# Patient Record
Sex: FEMALE | Race: WHITE | NOT HISPANIC OR LATINO
[De-identification: names, ages, dates, MRNs, and addresses within clinical notes are randomized per-mention and may not be internally consistent; named-entity substitution may affect disease eponyms.]

---

## 2017-06-19 ENCOUNTER — APPOINTMENT (OUTPATIENT)
Dept: DERMATOLOGY | Facility: CLINIC | Age: 72
End: 2017-06-19

## 2018-06-21 ENCOUNTER — RECORD ABSTRACTING (OUTPATIENT)
Age: 73
End: 2018-06-21

## 2018-06-21 DIAGNOSIS — Z95.828 PRESENCE OF OTHER VASCULAR IMPLANTS AND GRAFTS: ICD-10-CM

## 2018-06-21 DIAGNOSIS — Z78.9 OTHER SPECIFIED HEALTH STATUS: ICD-10-CM

## 2018-06-21 DIAGNOSIS — Z98.890 OTHER SPECIFIED POSTPROCEDURAL STATES: ICD-10-CM

## 2018-06-21 RX ORDER — ASPIRIN 81 MG
81 TABLET, DELAYED RELEASE (ENTERIC COATED) ORAL DAILY
Refills: 0 | Status: ACTIVE | COMMUNITY

## 2018-07-02 ENCOUNTER — APPOINTMENT (OUTPATIENT)
Dept: CARDIOLOGY | Facility: CLINIC | Age: 73
End: 2018-07-02
Payer: MEDICARE

## 2018-07-02 VITALS
HEART RATE: 69 BPM | WEIGHT: 158 LBS | HEIGHT: 62 IN | SYSTOLIC BLOOD PRESSURE: 110 MMHG | BODY MASS INDEX: 29.08 KG/M2 | DIASTOLIC BLOOD PRESSURE: 70 MMHG | RESPIRATION RATE: 18 BRPM

## 2018-07-02 DIAGNOSIS — Z82.49 FAMILY HISTORY OF ISCHEMIC HEART DISEASE AND OTHER DISEASES OF THE CIRCULATORY SYSTEM: ICD-10-CM

## 2018-07-02 DIAGNOSIS — Z87.19 PERSONAL HISTORY OF OTHER DISEASES OF THE DIGESTIVE SYSTEM: ICD-10-CM

## 2018-07-02 DIAGNOSIS — K52.9 NONINFECTIVE GASTROENTERITIS AND COLITIS, UNSPECIFIED: ICD-10-CM

## 2018-07-02 PROCEDURE — 93000 ELECTROCARDIOGRAM COMPLETE: CPT

## 2018-07-02 PROCEDURE — 99214 OFFICE O/P EST MOD 30 MIN: CPT

## 2018-08-23 ENCOUNTER — MEDICATION RENEWAL (OUTPATIENT)
Age: 73
End: 2018-08-23

## 2018-08-23 RX ORDER — VALSARTAN 40 MG/1
40 TABLET, COATED ORAL DAILY
Qty: 90 | Refills: 3 | Status: DISCONTINUED | COMMUNITY
End: 2018-08-23

## 2018-09-10 ENCOUNTER — RX RENEWAL (OUTPATIENT)
Age: 73
End: 2018-09-10

## 2018-11-20 ENCOUNTER — APPOINTMENT (OUTPATIENT)
Dept: CARDIOLOGY | Facility: CLINIC | Age: 73
End: 2018-11-20
Payer: MEDICARE

## 2018-11-20 PROCEDURE — 93306 TTE W/DOPPLER COMPLETE: CPT

## 2018-11-26 ENCOUNTER — APPOINTMENT (OUTPATIENT)
Dept: CARDIOLOGY | Facility: CLINIC | Age: 73
End: 2018-11-26
Payer: MEDICARE

## 2018-11-26 VITALS
SYSTOLIC BLOOD PRESSURE: 125 MMHG | BODY MASS INDEX: 28.52 KG/M2 | DIASTOLIC BLOOD PRESSURE: 80 MMHG | RESPIRATION RATE: 16 BRPM | WEIGHT: 155 LBS | HEIGHT: 62 IN | HEART RATE: 68 BPM

## 2018-11-26 PROCEDURE — 93000 ELECTROCARDIOGRAM COMPLETE: CPT

## 2018-11-26 PROCEDURE — 99214 OFFICE O/P EST MOD 30 MIN: CPT

## 2018-11-26 RX ORDER — RANOLAZINE 500 MG/1
500 TABLET, FILM COATED, EXTENDED RELEASE ORAL
Qty: 180 | Refills: 3 | Status: DISCONTINUED | COMMUNITY
End: 2018-11-26

## 2018-11-26 RX ORDER — PANTOPRAZOLE SODIUM 40 MG/1
40 TABLET, DELAYED RELEASE ORAL DAILY
Qty: 90 | Refills: 3 | Status: DISCONTINUED | COMMUNITY
End: 2018-11-26

## 2018-11-26 NOTE — ASSESSMENT
[FreeTextEntry1] : ECG:  Regular sinus rhythm at 69 bpm.  Minor non-specific T-wave flattening.  No other significant ST-T wave changes. \par \par Echo 11/20/18\par Overall normal LV size and function. LVEF 60-65%.\par Mild to moderate mitral regurgitation.\par Trivial pericardial effusion.\par Unchanged from prior study 2014.\par \par Labs 05/14/2018 reveal electrolytes normal.  Cholesterol 151.  HDL 49.  LDL 79.   Triglycerides 124.   Of all which reflect an improvement from the prior set of blood work. \par \par Impression:\par 1.  Blood pressure seems to be quite adequately controlled. The use of Valsartan has helped control the hypertension well.  \par 2.  There is no recurrent angina and she is taking her Ranexa regularly.  \par 3.  Lipids are very well controlled.\par 4.  There is no regular exercise. \par 5.  Echo results reviewed w pt.\par \par The patient seems to be stable and there is no indication of change of current management strategy.  She should have repeat visit with blood work in about four months.  She does not require a stress test but she does need to exercise more regularly.  Weight loss was encouraged.  \par

## 2018-11-26 NOTE — REASON FOR VISIT
[FreeTextEntry1] : Patient presents here for a cardiac evaluation with a history which includes:\par 1.  LAD and RCA stenting 2014. \par 2.  Catheterization 2015 showing stent patency. \par 3.  Hypertension. \par 4.  Hyperlipidemia.  \par 5.  An angina syndrome. \par \par The patient has not been exercising regularly, but, she has not had any significant angina.  She states she is taking her medications regularly. \par \par She believes that her blood pressure has been well controlled.  There have been no new cardiac symptoms.  There are no other new intercurrent medical problems. \par

## 2019-08-02 ENCOUNTER — APPOINTMENT (OUTPATIENT)
Dept: CARDIOLOGY | Facility: CLINIC | Age: 74
End: 2019-08-02
Payer: MEDICARE

## 2019-08-02 ENCOUNTER — RECORD ABSTRACTING (OUTPATIENT)
Age: 74
End: 2019-08-02

## 2019-08-02 ENCOUNTER — NON-APPOINTMENT (OUTPATIENT)
Age: 74
End: 2019-08-02

## 2019-08-02 VITALS
RESPIRATION RATE: 16 BRPM | HEART RATE: 72 BPM | WEIGHT: 159 LBS | DIASTOLIC BLOOD PRESSURE: 80 MMHG | OXYGEN SATURATION: 98 % | HEIGHT: 62 IN | BODY MASS INDEX: 29.26 KG/M2 | SYSTOLIC BLOOD PRESSURE: 120 MMHG

## 2019-08-02 PROCEDURE — 93000 ELECTROCARDIOGRAM COMPLETE: CPT

## 2019-08-02 PROCEDURE — 99214 OFFICE O/P EST MOD 30 MIN: CPT

## 2019-08-02 RX ORDER — PANTOPRAZOLE 40 MG/1
40 TABLET, DELAYED RELEASE ORAL
Qty: 90 | Refills: 3 | Status: DISCONTINUED | COMMUNITY
Start: 2018-09-10 | End: 2019-08-02

## 2019-08-02 NOTE — REASON FOR VISIT
[FreeTextEntry1] : Patient presents here for a cardiac evaluation with a history which includes:\par 1.  LAD and RCA stenting 2014. \par 2.  Catheterization 2015 showing stent patency. \par 3.  Hypertension. \par 4.  Hyperlipidemia.  \par 5.  An angina syndrome. \par \par \par \par She believes that her blood pressure has been well controlled.  There have been no new cardiac symptoms.  There are no other new intercurrent medical problems. \par

## 2019-08-02 NOTE — PHYSICAL EXAM
[FreeTextEntry1] :  Well appearing and nourished with no obvious deformities or distress.\par \par Eyes: \par No conjunctival injection and no xanthelasmas.\par HEENT: \par Normocephalic.Normal oral mucosa. No pallor or cyanosis\par Neck: \par No jugular venous distension. with normal A and V wave forms. No palpable adenopathy.\par Cardiovascular: \par Normal rate and rhythm with normal S1, S2 and a grade 1/6 systolic murmur. Distal arterial pulses are normal. No significant peripheral edema.\par Pulmonary: \par Lungs are clear to auscultation and percussion. Normal respiratory pattern without any accessory muscle use\par Abdomen: \par Soft, non-tender ; no palpable organomegaly or masses.\par Extremities:\par No digital clubbing, cyanosis or ischemic changes.\par Skin: \par No skin lesions, rashes, ulcers or xanthomas.\par Psychiatric: \par Alert and oriented to person, place and time. Appropriate mood and affect.

## 2019-08-02 NOTE — HISTORY OF PRESENT ILLNESS
[FreeTextEntry1] : Patient reportedly developed increasing chest pain while she was in [place]. Was hospitalized and had a cardiac catheterization, that by her report, demonstrated that her stents were patent. As such, a cardiac cause of her chest pain was thought to be unlikely.\par She then underwent a GI evaluation and GERD was considered to be more likely. Her acid reduction medication was modified and symptoms improved.

## 2019-08-02 NOTE — REVIEW OF SYSTEMS
[Recent Weight Gain (___ Lbs)] : recent [unfilled] ~Ulb weight gain [FreeTextEntry1] : Other than as documented here and in the HPI, the thirteen point ROS is negative

## 2019-08-02 NOTE — ASSESSMENT
[FreeTextEntry1] : ECG:Sinus  Rhythm  72 BPM\par -  Nonspecific T-abnormality. \par \par Lab data on 4/3/19:\par Cholesterol 172\par HDL              50\par LDL              94\par Triglycerides 186\par LFTs are normal.\par \par Echo 11/20/18\par Overall normal LV size and function. LVEF 60-65%.\par Mild to moderate mitral regurgitation.\par Trivial pericardial effusion.\par Unchanged from prior study 2014.\par \par Labs 05/14/2018 reveal electrolytes normal.  Cholesterol 151.  HDL 49.  LDL 79.   Triglycerides 124.   Of all which reflect an improvement from the prior set of blood work. \par \par Impression:\par 1.  Blood pressure seems to be quite adequately controlled. The use of Valsartan has helped control the      hypertension well.  \par 2.  There is no recurrent angina and she is requesting to be weaned of Ranexa\par 3.  Lipids are increased\par 4.  There is no regular exercise. \par 5.  Echo results reviewed w pt. shows moderate MR\par 6.  Cardiac cath [facility] [place] reportedly shows that stents are patent\par \par The patient seems to be stable.\par We can reduce her Ranexa to 500 mg AM only and see how she does clinically\par She should have repeat visit with blood work in about four months. \par May need to augment the statin Rx\par  She does not require a stress test but she does need to exercise more regularly.  Weight loss was encouraged.  \par

## 2019-08-09 ENCOUNTER — RX RENEWAL (OUTPATIENT)
Age: 74
End: 2019-08-09

## 2019-08-15 ENCOUNTER — RX RENEWAL (OUTPATIENT)
Age: 74
End: 2019-08-15

## 2019-08-19 ENCOUNTER — RX RENEWAL (OUTPATIENT)
Age: 74
End: 2019-08-19

## 2019-08-22 ENCOUNTER — MEDICATION RENEWAL (OUTPATIENT)
Age: 74
End: 2019-08-22

## 2019-11-22 ENCOUNTER — RECORD ABSTRACTING (OUTPATIENT)
Age: 74
End: 2019-11-22

## 2019-12-03 ENCOUNTER — APPOINTMENT (OUTPATIENT)
Dept: CARDIOLOGY | Facility: CLINIC | Age: 74
End: 2019-12-03
Payer: MEDICARE

## 2019-12-03 ENCOUNTER — NON-APPOINTMENT (OUTPATIENT)
Age: 74
End: 2019-12-03

## 2019-12-03 ENCOUNTER — RECORD ABSTRACTING (OUTPATIENT)
Age: 74
End: 2019-12-03

## 2019-12-03 VITALS
OXYGEN SATURATION: 98 % | BODY MASS INDEX: 28.71 KG/M2 | WEIGHT: 156 LBS | HEIGHT: 62 IN | RESPIRATION RATE: 16 BRPM | HEART RATE: 80 BPM | SYSTOLIC BLOOD PRESSURE: 110 MMHG | DIASTOLIC BLOOD PRESSURE: 65 MMHG

## 2019-12-03 DIAGNOSIS — K21.9 GASTRO-ESOPHAGEAL REFLUX DISEASE W/OUT ESOPHAGITIS: ICD-10-CM

## 2019-12-03 DIAGNOSIS — Z00.00 ENCOUNTER FOR GENERAL ADULT MEDICAL EXAMINATION W/OUT ABNORMAL FINDINGS: ICD-10-CM

## 2019-12-03 PROCEDURE — 93000 ELECTROCARDIOGRAM COMPLETE: CPT

## 2019-12-03 PROCEDURE — 99214 OFFICE O/P EST MOD 30 MIN: CPT

## 2019-12-03 NOTE — REASON FOR VISIT
[FreeTextEntry1] : Patient presents here for a cardiac evaluation with a history which includes:\par 1.  LAD and RCA stenting 2014. \par 2.  Catheterization 2019 reportedly showing stent patency. \par 3.  Hypertension. \par 4.  Hyperlipidemia.  \par 5.  An angina syndrome. \par \par \par

## 2019-12-03 NOTE — HISTORY OF PRESENT ILLNESS
[FreeTextEntry1] : Voices concerns of midsternal chest discomfort after eating only, which she attributes to her known history of GERD. Also mentions some minor SOB with exertion. 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 \par \par In January '19   chest pain while down in [place] prompted cardiac catheterization, that by her report, demonstrated that her stents were patent. As such, a cardiac cause of her chest pain was thought to be unlikely.\par \par She then underwent a GI evaluation and GERD was considered to be more likely. \par \par Denies any  palpitations, edema, orthopnea or chest discomfort with exertion.\par \par \par

## 2019-12-03 NOTE — ASSESSMENT
[FreeTextEntry1] : ECG:Sinus  Rhythm  at 60 bpm, diffuse non specific ST-T wave abnormalities. \par \par Lab data on 8/9/19\par Cholesterol 165\par HDL              50\par LDL              86\par Triglycerides 191\par Creat. 1.01\par LFTs are normal.\par \par Echo 11/20/18\par Overall normal LV size and function. LVEF 60-65%.\par Mild to moderate mitral regurgitation.\par Trivial pericardial effusion.\par Unchanged from prior study 2014.\par \par Labs 05/14/2018 reveal electrolytes normal.  Cholesterol 151.  HDL 49.  LDL 79.   Triglycerides 124.   Of all which reflect an improvement from the prior set of blood work. \par \par Impression:\par 1.  Blood pressures adequately controlled, today 110/65.\par 2.  Mid sternal chest discomfort which is likely related to HX of GERD and is only present when she eats, not      suggestive of any cardiac process. ECG unchanged form prior. \par 3.  June lipids elevated, currently not on statin therapy.\par 4.  Increased fatigue and SOB due to deconditioning. She is limited by back discomfort  with nor formal exercise regimen.\par 5.  2018 Echo results shows moderate MR\par 6.  Cardiac cath Jan of 2019 completed in  [place] reportedly shows that stents are patent\par \par \par Plan\par 1. Schedule Carotid doppler.\par 2. Repeat EST and echo by the Spring. \par 3. Will call [facility] for blood work .  \par 4. Plavix and ASA can be held 5 days prior to her Cataract surgery. This will be performed in [place] she is unsure     of the exact details of the performing surgeon.\par \par \par Clinical follow up in 6 months \par

## 2020-05-26 ENCOUNTER — APPOINTMENT (OUTPATIENT)
Dept: CARDIOLOGY | Facility: CLINIC | Age: 75
End: 2020-05-26

## 2020-06-02 ENCOUNTER — APPOINTMENT (OUTPATIENT)
Dept: CARDIOLOGY | Facility: CLINIC | Age: 75
End: 2020-06-02

## 2020-06-08 ENCOUNTER — RX RENEWAL (OUTPATIENT)
Age: 75
End: 2020-06-08

## 2020-07-15 RX ORDER — RANITIDINE 150 MG/1
150 TABLET ORAL TWICE DAILY
Qty: 180 | Refills: 3 | Status: DISCONTINUED | COMMUNITY
Start: 1900-01-01 | End: 2020-07-15

## 2020-07-23 ENCOUNTER — APPOINTMENT (OUTPATIENT)
Dept: CARDIOLOGY | Facility: CLINIC | Age: 75
End: 2020-07-23
Payer: MEDICARE

## 2020-07-23 PROCEDURE — 93015 CV STRESS TEST SUPVJ I&R: CPT

## 2020-08-08 ENCOUNTER — APPOINTMENT (OUTPATIENT)
Dept: CARDIOLOGY | Facility: CLINIC | Age: 75
End: 2020-08-08
Payer: MEDICARE

## 2020-08-08 PROCEDURE — 93015 CV STRESS TEST SUPVJ I&R: CPT

## 2020-08-08 PROCEDURE — A9500: CPT

## 2020-08-08 PROCEDURE — 78452 HT MUSCLE IMAGE SPECT MULT: CPT

## 2020-08-08 RX ADMIN — AMINOPHYLLINE 3 MG/ML: 25 INJECTION, SOLUTION INTRAVENOUS at 00:00

## 2020-08-08 RX ADMIN — REGADENOSON 5 MG/5ML: 0.08 INJECTION, SOLUTION INTRAVENOUS at 00:00

## 2020-08-10 RX ORDER — REGADENOSON 0.08 MG/ML
0.4 INJECTION, SOLUTION INTRAVENOUS
Qty: 4 | Refills: 0 | Status: COMPLETED | OUTPATIENT
Start: 2020-08-08

## 2020-08-10 RX ORDER — AMINOPHYLLINE 25 MG/ML
25 INJECTION, SOLUTION INTRAVENOUS
Qty: 0 | Refills: 0 | Status: COMPLETED | OUTPATIENT
Start: 2020-08-08

## 2020-08-27 ENCOUNTER — RX RENEWAL (OUTPATIENT)
Age: 75
End: 2020-08-27

## 2020-09-10 RX ORDER — KIT FOR THE PREPARATION OF TECHNETIUM TC99M SESTAMIBI 1 MG/5ML
INJECTION, POWDER, LYOPHILIZED, FOR SOLUTION PARENTERAL
Refills: 0 | Status: COMPLETED | OUTPATIENT
Start: 2020-09-10

## 2020-09-10 RX ADMIN — KIT FOR THE PREPARATION OF TECHNETIUM TC99M SESTAMIBI 0: 1 INJECTION, POWDER, LYOPHILIZED, FOR SOLUTION PARENTERAL at 00:00

## 2020-10-09 ENCOUNTER — APPOINTMENT (OUTPATIENT)
Dept: CARDIOLOGY | Facility: CLINIC | Age: 75
End: 2020-10-09
Payer: MEDICARE

## 2020-10-09 PROCEDURE — 93880 EXTRACRANIAL BILAT STUDY: CPT

## 2020-10-09 PROCEDURE — 93306 TTE W/DOPPLER COMPLETE: CPT

## 2020-10-12 ENCOUNTER — NON-APPOINTMENT (OUTPATIENT)
Age: 75
End: 2020-10-12

## 2020-10-12 ENCOUNTER — APPOINTMENT (OUTPATIENT)
Dept: CARDIOLOGY | Facility: CLINIC | Age: 75
End: 2020-10-12
Payer: MEDICARE

## 2020-10-12 VITALS
HEIGHT: 62 IN | WEIGHT: 155 LBS | DIASTOLIC BLOOD PRESSURE: 80 MMHG | OXYGEN SATURATION: 97 % | TEMPERATURE: 97.6 F | RESPIRATION RATE: 16 BRPM | BODY MASS INDEX: 28.52 KG/M2 | SYSTOLIC BLOOD PRESSURE: 128 MMHG | HEART RATE: 74 BPM

## 2020-10-12 PROCEDURE — 99214 OFFICE O/P EST MOD 30 MIN: CPT

## 2020-10-12 PROCEDURE — 93000 ELECTROCARDIOGRAM COMPLETE: CPT

## 2020-10-12 NOTE — HISTORY OF PRESENT ILLNESS
[FreeTextEntry1] : Seems to be feeling much better.  No recurrence of her chest pain.  Not experiencing any shortness of breath.\par Plans on leaving for [place] in a week.  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 \par \par In January '19   chest pain while down in [place] prompted cardiac catheterization, that by her report, demonstrated that her stents were patent. As such, a cardiac cause of her chest pain was thought to be unlikely.\par \par She then underwent a GI evaluation and GERD was considered to be more likely. \par \par Denies any  palpitations, edema, orthopnea or chest discomfort with exertion.\par \par \par

## 2020-10-12 NOTE — REASON FOR VISIT
[FreeTextEntry1] : Patient presents here for a cardiac evaluation with a history which includes:\par \par 1.  LAD and RCA stenting 2014. \par 2.  Catheterization 2019 reportedly showing stent patency. \par 3.  Hypertension. \par 4.  Hyperlipidemia.  \par 5.  An anginal syndrome. \par \par \par

## 2020-10-12 NOTE — ASSESSMENT
[FreeTextEntry1] : ECG:Sinus  Rhythm  at 74 bpm, diffuse non specific ST-T wave abnormalities. \par \par Lab Data 2020\par Chol: 160\par HDL: 49\par LDL: 83\par Tr\par Creat: 0.97\par \par \par \par Lab data on 19\par Cholesterol 165\par HDL              50\par LDL              86\par Triglycerides 191\par Creat. 1.01\par LFTs are normal.\par \par Echocardiogram 10/9/2020:\par Left ventricular ejection fraction 55 to 60%\par 1+ mitral regurgitation.\par \par Carotid Doppler 10/9/2020:\par Mild left internal carotid artery plaquing.\par Right internal carotid artery appears to be pretty clean.\par \par Pharmacologic nuclear stress test 2020:\par No significant ischemia is demonstrated.\par \par Echo 18\par Overall normal LV size and function. LVEF 60-65%.\par Mild to moderate mitral regurgitation.\par Trivial pericardial effusion.\par Unchanged from prior study .\par \par Labs 2018 reveal electrolytes normal.  Cholesterol 151.  HDL 49.  LDL 79.   Triglycerides 124.   Of all which reflect an improvement from the prior set of blood work. \par \par Impression:\par 1.  Blood pressures adequately controlled\par 2.  Mid sternal chest discomfort which is likely related to HX of GERD and is only present when she eats, not      suggestive of any cardiac process. ECG unchanged form prior.  Nuclear stress test shows no evidence of ischemia.\par 3.  Lipids are reasonable though still above target on 20 mg of simvastatin.\par 4.  Increased fatigue and SOB due to deconditioning. She is limited by back discomfort  with nor formal exercise regimen.\par 5.  2018 Echo results shows mild MR\par 6.  Cardiac cath  completed in  [place] reportedly shows that stents are patent\par 7.  No significant carotid disease\par \par Plan\par 1.  Reassured the patient that in view of resolution of chest pain in the absence of ischemia on nuclear stress testing and a cardiac catheterization which did not show any significant in-stent restenosis, that is ischemic chest pain is very unlikely at this point in time.\par \par 2.  Improvement in the mitral regurgitation from moderate to mild\par We will consider rechecking this in 1 to 2 years\par \par 3.  Reasonable though not perfect control of her lipids on 20 of simvastatin\par We will consider a change to rosuvastatin 20 mg on follow-up\par \par 4.  No reason the patient cannot travel to [place].\par \par \par Clinical follow up in 6 months \par

## 2020-10-22 ENCOUNTER — APPOINTMENT (OUTPATIENT)
Dept: CARDIOLOGY | Facility: CLINIC | Age: 75
End: 2020-10-22

## 2020-10-30 ENCOUNTER — APPOINTMENT (OUTPATIENT)
Dept: CARDIOLOGY | Facility: CLINIC | Age: 75
End: 2020-10-30

## 2021-06-02 ENCOUNTER — RX RENEWAL (OUTPATIENT)
Age: 76
End: 2021-06-02

## 2021-06-15 ENCOUNTER — APPOINTMENT (OUTPATIENT)
Dept: CARDIOLOGY | Facility: CLINIC | Age: 76
End: 2021-06-15
Payer: MEDICARE

## 2021-06-15 VITALS
BODY MASS INDEX: 29.44 KG/M2 | RESPIRATION RATE: 16 BRPM | HEART RATE: 72 BPM | SYSTOLIC BLOOD PRESSURE: 122 MMHG | OXYGEN SATURATION: 97 % | DIASTOLIC BLOOD PRESSURE: 70 MMHG | WEIGHT: 160 LBS | TEMPERATURE: 97.1 F | HEIGHT: 62 IN

## 2021-06-15 PROCEDURE — 99214 OFFICE O/P EST MOD 30 MIN: CPT

## 2021-06-15 PROCEDURE — 93000 ELECTROCARDIOGRAM COMPLETE: CPT

## 2021-06-15 NOTE — ASSESSMENT
[FreeTextEntry1] : ECG:Sinus  Rhythm  at 72  bpm, diffuse non specific ST-T wave abnormalities. \par \par Lab Data\par        7/7/20  3/29/21\par Chol: 160      163\par HDL: 49         44\par LDL: 83         82\par Tr       185\par Creat: 0.97\par \par \par Lab data on 19\par Cholesterol 165\par HDL              50\par LDL              86\par Triglycerides 191\par Creat. 1.01\par LFTs are normal.\par \par Labs 2018 \par  electrolytes normal.  \par Cholesterol 151.  \par HDL 49.  \par LDL 79.   \par Triglycerides 124.  \par \par Echocardiogram 10/9/2020:\par Left ventricular ejection fraction 55 to 60%\par 1+ mitral regurgitation.\par \par \par Echo 18\par Overall normal LV size and function. LVEF 60-65%.\par Mild to moderate mitral regurgitation.\par Trivial pericardial effusion.\par Unchanged from prior study .\par \par \par Carotid Doppler 10/9/2020:\par Mild left internal carotid artery plaquing.\par Right internal carotid artery appears to be pretty clean.\par \par Pharmacologic nuclear stress test 2020:\par No significant ischemia is demonstrated.\par \par Impression:\par 1.  Blood pressures adequately controlled\par 2.  Mid sternal chest discomfort which is likely related to HX of GERD and is only present when she eats, not              suggestive of any cardiac process. ECG unchanged form prior.  Nuclear stress test shows no evidence of           ischemia.\par 3.  Lipids are reasonable though still above target on 20 mg of simvastatin.\par 4.  Increased fatigue and SOB due to deconditioning. She is limited by back discomfort  with nor formal exercise regimen.\par 5.   Echo results shows mild MR\par 6.  Cardiac cath  completed in  [place] reportedly shows that stents are patent\par 7.  No significant carotid disease\par 8.  Activity level becoming increasingly limited by back pain with resultant weight gain\par \par Plan\par 1.  Reassured the patient that in view of resolution of chest pain in the absence of ischemia on nuclear stress testing and a cardiac catheterization which did not show any significant in-stent restenosis, that is ischemic chest pain is very unlikely at this point in time.\par \par 2.  Improvement in the mitral regurgitation from moderate to mild\par We will consider rechecking this in 1 to 2 years\par \par 3.  Reasonable though not perfect control of her lipids on 20 of simvastatin\par We will consider a change to rosuvastatin 20 mg on follow-up\par \par 4.  No reason the patient cannot travel to [place].\par \par \par Clinical follow up in 6 months \par

## 2021-06-15 NOTE — HISTORY OF PRESENT ILLNESS
[FreeTextEntry1] : Seems to be feeling much better.  No recurrence of her chest pain.  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  There is also a symptom complex is suggestive of neuropathy.\par Has noticed that through out her day she needs to take periodic breaks due to fatigue. \par \par In January '19   chest pain while down in [place] prompted cardiac catheterization, that by her report, demonstrated that her stents were patent. As such, a cardiac cause of her chest pain was thought to be unlikely.\par \par She then underwent a GI evaluation and GERD was considered to be more likely. \par \par Denies any  palpitations, edema, orthopnea or chest discomfort with exertion.\par \par \par

## 2021-08-29 ENCOUNTER — RX RENEWAL (OUTPATIENT)
Age: 76
End: 2021-08-29

## 2021-09-17 RX ORDER — NITROGLYCERIN 0.4 MG/1
0.4 TABLET SUBLINGUAL
Qty: 1 | Refills: 2 | Status: ACTIVE | COMMUNITY
Start: 2019-12-03 | End: 1900-01-01

## 2021-11-18 ENCOUNTER — RX RENEWAL (OUTPATIENT)
Age: 76
End: 2021-11-18

## 2021-12-07 ENCOUNTER — APPOINTMENT (OUTPATIENT)
Dept: CARDIOLOGY | Facility: CLINIC | Age: 76
End: 2021-12-07
Payer: MEDICARE

## 2021-12-07 VITALS
RESPIRATION RATE: 16 BRPM | SYSTOLIC BLOOD PRESSURE: 125 MMHG | BODY MASS INDEX: 29.44 KG/M2 | OXYGEN SATURATION: 98 % | DIASTOLIC BLOOD PRESSURE: 80 MMHG | HEIGHT: 62 IN | WEIGHT: 160 LBS | HEART RATE: 73 BPM

## 2021-12-07 PROCEDURE — 99214 OFFICE O/P EST MOD 30 MIN: CPT

## 2021-12-07 PROCEDURE — 93000 ELECTROCARDIOGRAM COMPLETE: CPT

## 2021-12-07 NOTE — ASSESSMENT
[FreeTextEntry1] : ECG:Sinus  Rhythm  at 73  bpm, with LBBB\par \par Lab Data\par        7/7/20  3/29/21  ------------------- 21\par \par Chol: 160      163      ------------------- 160\par HDL: 49         44       ------------------- 48\par LDL: 83         82       ------------------- 87\par Tr       185     ------------------- 146\par Creat: 0.97                                        1.04\par A1C                                                    5.3\par \par \par \par Echocardiogram 10/9/2020:\par Left ventricular ejection fraction 55 to 60%\par 1+ mitral regurgitation.\par \par \par Echo 18\par Overall normal LV size and function. LVEF 60-65%.\par Mild to moderate mitral regurgitation.\par Trivial pericardial effusion.\par Unchanged from prior study .\par \par \par Carotid Doppler 10/9/2020:\par Mild left internal carotid artery plaquing.\par Right internal carotid artery appears to be pretty clean.\par \par Pharmacologic nuclear stress test 2020:\par No significant ischemia is demonstrated.\par \par Impression:\par 1.  Blood pressures adequately controlled.  Transient hypotension noted which has not reoccurred\par \par 2.  Prior Mid sternal chest discomfort which is likely related to HX of GERD and is only present when she eats, not       suggestive of any cardiac process Nuclear stress test shows no evidence of ischemia.\par \par 3. ECG today with new LBBB but still w/ any coronary symptoms. Likely that change is is just progressive      conduction disease but we can not exclude  cardiomyopathic changes.  Given absence of any significant findings on cardiac catheterization , ischemia is far less likely\par \par 3.  LDL still not at goal.\par \par 4.  Baseline fatigue.She is limited by back discomfort and now heel spur with no formal exercise regimen.\par \par 2020 Echo results shows improvement in the mitral regurgitation from moderate to mild\par \par 6.  Cardiac cath  completed in  [place] reportedly shows that stents are patent\par \par 7.  No significant carotid disease\par \par \par Plan\par 1.  Reassured the patient that in view of resolution of chest pain in the absence of ischemia on nuclear stress testing and a cardiac catheterization which did not show any significant in-stent restenosis, that is ischemic chest pain is very unlikely at this point in time.\par \par 2.  Repeat echo with regard to new LBBB.\par \par 3.  For optimal control of LDL with a goal of <70 and her hx o CAD we will change Simvastatin to Rosuvastatin             20 mg QD. \par      -Repeat BW due in 3 months to reassess lipids and LFTs. \par \par Clinical follow up in 6 months \par

## 2021-12-07 NOTE — HISTORY OF PRESENT ILLNESS
[FreeTextEntry1] : Generally speaking feels well. Did have an URI in Aug.  No recurrence of her chest pain.  Not experiencing any shortness of breath.\par \par In Sept-Oct. she was having some issues with Hypotension/ lightheadedness. He PCP suggested she stop her HCTZ and Irbesartan which she did for a short period but restarted both meds b/c blood pressure started to trend upward.  No recurrence of hypotension noted.\par \par Plans on returning to [place] for one month. 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  There is also a symptom complex is suggestive of neuropathy.\par \par In January '19   chest pain while down in [place] prompted cardiac catheterization, that by her report, demonstrated that her stents were patent. As such, a cardiac cause of her chest pain was thought to be unlikely.\par \par She then underwent a GI evaluation and GERD was considered to be more likely. \par \par \par

## 2021-12-14 ENCOUNTER — APPOINTMENT (OUTPATIENT)
Dept: CARDIOLOGY | Facility: CLINIC | Age: 76
End: 2021-12-14
Payer: MEDICARE

## 2021-12-14 PROCEDURE — 93306 TTE W/DOPPLER COMPLETE: CPT

## 2022-03-22 ENCOUNTER — NON-APPOINTMENT (OUTPATIENT)
Age: 77
End: 2022-03-22

## 2022-06-28 ENCOUNTER — APPOINTMENT (OUTPATIENT)
Dept: CARDIOLOGY | Facility: CLINIC | Age: 77
End: 2022-06-28

## 2022-06-28 VITALS
BODY MASS INDEX: 29.44 KG/M2 | OXYGEN SATURATION: 97 % | DIASTOLIC BLOOD PRESSURE: 70 MMHG | HEART RATE: 73 BPM | HEIGHT: 62 IN | WEIGHT: 160 LBS | SYSTOLIC BLOOD PRESSURE: 114 MMHG | RESPIRATION RATE: 16 BRPM

## 2022-06-28 PROCEDURE — 99214 OFFICE O/P EST MOD 30 MIN: CPT

## 2022-06-28 PROCEDURE — 93000 ELECTROCARDIOGRAM COMPLETE: CPT

## 2022-06-28 RX ORDER — NYSTATIN 100000 U/G
100000 OINTMENT TOPICAL
Qty: 30 | Refills: 0 | Status: DISCONTINUED | COMMUNITY
Start: 2022-03-24

## 2022-06-28 RX ORDER — NITROFURANTOIN (MONOHYDRATE/MACROCRYSTALS) 25; 75 MG/1; MG/1
100 CAPSULE ORAL
Qty: 10 | Refills: 0 | Status: DISCONTINUED | COMMUNITY
Start: 2022-06-20

## 2022-06-28 NOTE — REASON FOR VISIT
[FreeTextEntry1] : Patient presents here for a cardiac evaluation with a history which includes:\par \par 1.  LAD and RCA stenting 2014. \par 2.  Catheterization 2019 in [place] reportedly showing stent patency. \par 3.  Hypertension. \par 4.  Hyperlipidemia.  \par 5.  An anginal syndrome. \par 6. Carotid artery atherosclerosis\par \par \par \par

## 2022-06-28 NOTE — ASSESSMENT
[FreeTextEntry1] : ECG:Sinus  Rhythm  at 73  bpm, LBBB\par \par Lab Data\par        20-----3/29/21---- 21---2/3/22\par Chol: 160--------163--------- 160-------135\par HDL:  49----------44---------- 48--------48\par LDL:  83----------82---------- 87---------66\par Tr---------185-------- 146-------128\par Cr     0.97---------------------1.04\par A1C ---------------------------5.3\par \par \par Nuclear Stress Test 22 (Done in [place] for clearance for Mohs procedure)\par Negative for ischemia, rate related BBB\par \par Echocardiogram 21:\par Normal LV internal cavity size\par Abnormal septal motion\par LVEF 65%\par Normal biatrial size and structure\par Mild mitral regurgitation\par \par Echocardiogram 10/9/2020:\par Left ventricular ejection fraction 55 to 60%\par 1+ mitral regurgitation.\par \par \par Echo 18\par Overall normal LV size and function. LVEF 60-65%.\par Mild to moderate mitral regurgitation.\par Trivial pericardial effusion.\par Unchanged from prior study .\par \par \par Carotid Doppler 10/9/2020:\par Mild left internal carotid artery plaquing.\par Right internal carotid artery appears to be pretty clean.\par \par Pharmacologic nuclear stress test 2020:\par No significant ischemia is demonstrated.\par \par Impression:\par 1.  Blood pressures adequately controlled. Past reports of hypotension have not recurred. \par \par 2.  Prior Mid sternal chest discomfort which is likely related to HX of GERD and is only present when she eats, not       suggestive of any cardiac process. Nuclear stress test shows no evidence of ischemia. Currently being      followed by GI, takes Protonix daily. \par \par 3. ECG with persistent LBBB, unchanged from prior. Pt without any coronary symptoms. Likely this change is     progressive conduction disease but we can not exclude  cardiomyopathic changes.  Given absence of any     significant findings on cardiac catheterization in  and more recently nuclear stress test in 2022      ischemia is far less like the echocardiogram 2021 did not show any new wall motion abnormalities other than asynchrony of the septum consistent with conduction abnormality\par \par 3.  LDL now well controlled and at goal of less than 70 since switching to Rosuvastatin. \par \par 4.  Baseline fatigue.She is limited by back discomfort and now heel spur with no formal exercise regimen.\par \par 5.   Echo results shows improvement in the mitral regurgitation from moderate to mild\par \par 6.  Cardiac cath  completed in  [place] reportedly shows that stents are patent\par       Pharmacologic nuclear stress 2022 without ischemia.\par \par 7.  No significant carotid disease. No bruit on exam. \par \par \par Plan\par 1.  Reassured the patient that in view of resolution of chest pain and in the absence of ischemia on nuclear stress testing and  cardiac catheterization which did not show any significant stent restenosis, her past chest pain syndrome is not likely cardiac in nature. \par \par 2.  No additional cardiac testing at this time. \par \par 3. Advised to exercise to the extent that she can. Advised to do light to moderate exercises such as biking or walking at least 30 minutes a day most days of the week. \par \par 4. Continue to follow a low-fat, low-carbohydrate diet. Continue to avoid any processed foods and those high in sodium. \par \par 5. Follow-up blood work in 2 months. \par \par \par Pt knows to call if any new or worsening symptoms. In the absence of any cardiac associated symptoms clinical follow up can be made in December. \par \par

## 2022-06-28 NOTE — HISTORY OF PRESENT ILLNESS
[FreeTextEntry1] : Generally speaking feels well. There is no formal exercise regimen. Reports left ankle pain and chronic back pain limiting her exercise. There is also a symptom complex is suggestive of neuropathy.Does go up and down stairs at home and does not experience any exertional symptoms. Pt denies any chest pain, dizziness, syncope, near-syncope, SOB, orthopnea or PND.\par \par In January 2019 she had chest pain while in [place] that prompted cardiac catheterization, that by her report, demonstrated that her stents were patent. As such, a cardiac cause of her chest pain was thought to be unlikely.\par \par She then underwent a GI evaluation and GERD was considered to be more likely. \par \par Patient had a pharmacologic nuclear stress test as part of a preoperative ischemic evaluation while in [place] 1/26/2022.  This was in anticipation of resection of a skin cancer from the right face.  That test reportedly showed gated ejection fraction and absence of any significant ischemia.\par \par In May of this year she was again in the hospital for epigastric/chest discomfort, later deemed to be GI in nature.\par \par Nutritionally, she reports following a diet that avoids salt, excess carbohydrates and fats. Cooks most of her foods at home. Avoiding any processed foods. She is also eating smaller portions and having more frequent meals per recommendations from her PMD. \par \par \par \par

## 2022-08-24 ENCOUNTER — NON-APPOINTMENT (OUTPATIENT)
Age: 77
End: 2022-08-24

## 2022-12-20 ENCOUNTER — APPOINTMENT (OUTPATIENT)
Dept: CARDIOLOGY | Facility: CLINIC | Age: 77
End: 2022-12-20

## 2022-12-20 VITALS
WEIGHT: 161 LBS | SYSTOLIC BLOOD PRESSURE: 120 MMHG | HEIGHT: 62 IN | OXYGEN SATURATION: 97 % | HEART RATE: 77 BPM | RESPIRATION RATE: 16 BRPM | DIASTOLIC BLOOD PRESSURE: 78 MMHG | BODY MASS INDEX: 29.63 KG/M2

## 2022-12-20 PROCEDURE — 93000 ELECTROCARDIOGRAM COMPLETE: CPT

## 2022-12-20 PROCEDURE — 99214 OFFICE O/P EST MOD 30 MIN: CPT

## 2022-12-20 NOTE — REVIEW OF SYSTEMS
[Joint Pain] : joint pain [Negative] : Heme/Lymph [Weight Gain (___ Lbs)] : no recent weight gain [Weight Loss (___ Lbs)] : no recent weight loss [FreeTextEntry9] : back pain, left ankle pain

## 2022-12-20 NOTE — ASSESSMENT
[FreeTextEntry1] : ECG:Sinus  Rhythm  at 77   bpm, LBBB\par \par Lab Data\par        20-----3/29/21---- 21---2/3/22--22\par Chol: 160--------163--------- 160-------135-------166\par HDL:  49----------44---------- 48--------48---------50\par LDL:  83----------82---------- 87---------66--------89\par Tr---------185-------- 146-------128-------170\par Cr     0.97---------------------1.04\par A1C ---------------------------5.3\par \par \par Nuclear Stress Test 22 (Done in [place] for clearance for Mohs procedure)\par Negative for ischemia, rate related BBB\par \par Echocardiogram 21:\par Normal LV internal cavity size\par Abnormal septal motion\par LVEF 65%\par Normal biatrial size and structure\par Mild mitral regurgitation\par \par Echocardiogram 10/9/2020:\par Left ventricular ejection fraction 55 to 60%\par 1+ mitral regurgitation.\par \par \par Echo 18\par Overall normal LV size and function. LVEF 60-65%.\par Mild to moderate mitral regurgitation.\par Trivial pericardial effusion.\par Unchanged from prior study .\par \par \par Carotid Doppler 10/9/2020:\par Mild left internal carotid artery plaquing.\par Right internal carotid artery appears to be pretty clean.\par \par Pharmacologic nuclear stress test 2020:\par No significant ischemia is demonstrated.\par \par Impression:\par 1.  Blood pressures adequately controlled. Past reports of hypotension have not recurred. \par \par 2.  Prior Mid sternal chest discomfort which is likely related to HX of GERD and is only present when she eats, not       suggestive of any cardiac process. Nuclear stress test shows no evidence of ischemia. Currently being      followed by GI, takes Protonix daily. \par \par 3. ECG with persistent LBBB, unchanged from prior. Pt without any coronary symptoms. Likely this change is     progressive conduction disease but we can not exclude  cardiomyopathic changes.  Given absence of any     significant findings on cardiac catheterization in  and more recently nuclear stress test in 2022      ischemia is far less like the echocardiogram 2021 did not show any new wall motion abnormalities other than asynchrony of the septum consistent with conduction abnormality\par \par 3.  LDL now well controlled and at goal of less than 70 since switching to Rosuvastatin. \par \par 4.  Baseline fatigue.She is limited by back discomfort and now heel spur with no formal exercise regimen.\par      Work-up for possible hypoglycemia continues in follow-up is to office of Dr. Antony russell\par \par 5.   Echo results shows improvement in the mitral regurgitation from moderate to mild\par \par 6.  Cardiac cath  completed in  [place] reportedly shows that stents are patent\par       Pharmacologic nuclear stress 2022 without ischemia.\par \par 7.  No significant carotid disease. No bruit on exam. \par \par \par Plan\par 1.  Reassured the patient that in view of resolution of chest pain and in the absence of ischemia on nuclear stress testing and  cardiac catheterization which did not show any significant stent restenosis, her past chest pain syndrome is not likely cardiac in nature. \par \par 2.  No additional cardiac testing at this time. \par \par 3. Advised to exercise to the extent that she can. Advised to do light to moderate exercises such as biking or walking at least 30 minutes a day most days of the week. \par \par 4. Continue to follow a low-fat, low-carbohydrate diet. Continue to avoid any processed foods and those high in sodium. \par \par 5. Follow-up blood work in 2 months. \par \par \par Pt knows to call if any new or worsening symptoms. In the absence of any cardiac associated symptoms clinical follow up can be made in 4 months. \par \par

## 2022-12-20 NOTE — HISTORY OF PRESENT ILLNESS
[FreeTextEntry1] : Primary complaint at this time is about periodic fatigability of uncertain etiology, duration or provocation.  Not necessarily exertional.  She is currently being worked up for periodic hypoglycemia.  She wore a monitor for a bit which did show sugars that ran in the 50s and 60s though uncertain if it is always correlated with symptoms.\par States that blood pressures checked during the periods of fatigue were not any lower.\par \par There is no formal exercise regimen. Reports left ankle pain and chronic back pain limiting her exercise. There is also a symptom complex is suggestive of neuropathy.Does go up and down stairs at home and does not experience any exertional symptoms. Pt denies any chest pain, dizziness, syncope, near-syncope, SOB, orthopnea or PND.\par \par In January 2019 she had chest pain while in [place] that prompted cardiac catheterization, that by her report, demonstrated that her stents were patent. As such, a cardiac cause of her chest pain was thought to be unlikely.\par \par She then underwent a GI evaluation and GERD was considered to be more likely. \par \par 1/26/2022, patient had a pharmacologic nuclear stress test as part of a preoperative ischemic evaluation while in [place].  This was in anticipation of resection of a skin cancer from the right face.  That test reportedly showed gated ejection fraction and absence of any significant ischemia.\par \par In May of this year she was again in the hospital for epigastric/chest discomfort, later deemed to be GI in nature.\par \par Nutritionally, she reports following a diet that avoids salt, excess carbohydrates and fats. Cooks most of her foods at home. Avoiding any processed foods. She is also eating smaller portions and having more frequent meals per recommendations from her PMD. \par \par \par \par

## 2023-04-21 ENCOUNTER — NON-APPOINTMENT (OUTPATIENT)
Age: 78
End: 2023-04-21

## 2023-04-21 RX ORDER — ROSUVASTATIN CALCIUM 20 MG/1
20 TABLET, FILM COATED ORAL DAILY
Qty: 90 | Refills: 2 | Status: DISCONTINUED | COMMUNITY
Start: 2021-12-07 | End: 2023-04-21

## 2023-06-09 ENCOUNTER — APPOINTMENT (OUTPATIENT)
Dept: CARDIOLOGY | Facility: CLINIC | Age: 78
End: 2023-06-09
Payer: MEDICARE

## 2023-06-09 ENCOUNTER — NON-APPOINTMENT (OUTPATIENT)
Age: 78
End: 2023-06-09

## 2023-06-09 VITALS
HEART RATE: 66 BPM | HEIGHT: 62 IN | OXYGEN SATURATION: 98 % | SYSTOLIC BLOOD PRESSURE: 112 MMHG | WEIGHT: 162 LBS | DIASTOLIC BLOOD PRESSURE: 80 MMHG | BODY MASS INDEX: 29.81 KG/M2 | RESPIRATION RATE: 16 BRPM

## 2023-06-09 DIAGNOSIS — R07.9 CHEST PAIN, UNSPECIFIED: ICD-10-CM

## 2023-06-09 PROCEDURE — 93000 ELECTROCARDIOGRAM COMPLETE: CPT

## 2023-06-09 PROCEDURE — 99214 OFFICE O/P EST MOD 30 MIN: CPT

## 2023-06-09 RX ORDER — EVOLOCUMAB 140 MG/ML
140 INJECTION, SOLUTION SUBCUTANEOUS
Refills: 0 | Status: DISCONTINUED | COMMUNITY
End: 2023-06-09

## 2023-06-09 NOTE — HISTORY OF PRESENT ILLNESS
[FreeTextEntry1] :  patient underwent an evaluation while in [place] (Dr. Scottie Rao) telephone [phone]\par Stress testing 3/23/2023:\par Time: 4 minutes 54 seconds\par Heart rate: 123 85%\par METS 4.7\par ECG and SPECT imaging negative for ischemia.\par \par Echocardiogram 3/23/2023:\par LVEF 60 to 70%\par No significant valvular or pericardial disease\par No evidence of pulm hypertension.\par \par Rosuvastatin was discontinued due to bilateral lower extremity myalgias.  Improved with cessation.\par Previously on simvastatin 20 mg a day which was discontinued December 2021 when LDL was not at goal.\par \par There is no formal exercise regimen. Reports left ankle pain and chronic back pain limiting her exercise. There is also a symptom complex is suggestive of neuropathy.Does go up and down stairs at home and does not experience any exertional symptoms. Pt denies any chest pain, dizziness, syncope, near-syncope, SOB, orthopnea or PND.\par \par In January 2019 she had chest pain while in [place] that prompted cardiac catheterization, that by her report, demonstrated that her stents were patent. As such, a cardiac cause of her chest pain was thought to be unlikely.\par \par She then underwent a GI evaluation and GERD was considered to be more likely. \par \par 1/26/2022, patient had a pharmacologic nuclear stress test as part of a preoperative ischemic evaluation while in [place].  This was in anticipation of resection of a skin cancer from the right face.  That test reportedly showed gated ejection fraction and absence of any significant ischemia.\par \par In May of this year she was again in the hospital for epigastric/chest discomfort, later deemed to be GI in nature.\par \par Nutritionally, she reports following a diet that avoids salt, excess carbohydrates and fats. Cooks most of her foods at home. Avoiding any processed foods. She is also eating smaller portions and having more frequent meals per recommendations from her PMD. \par \par \par \par

## 2023-06-09 NOTE — ASSESSMENT
[FreeTextEntry1] : ECG:Sinus  Rhythm  at 66 bpm, LBBB\par \par Lab Data\par        20-----3/29/21---- 21---2/3/22--22--23\par Chol: 160--------163--------- 160-------135-------166-------178\par HDL:  49----------44---------- 48--------48---------50---------50\par LDL:  83----------82---------- 87---------66--------89---------98\par Tr---------185-------- 146-------128-------170--------200\par Cr     0.97---------------------1.04\par A1C ---------------------------5.3\par \par \par Nuclear Stress Test 22 (Done in [place] for clearance for Mohs procedure)\par Negative for ischemia, rate related BBB\par \par Stress testing 3/23/2023:\par Time: 4 minutes 54 seconds\par Heart rate: 123 85%\par METS 4.7\par ECG and SPECT imaging negative for ischemia.\par \par Echocardiogram 3/23/2023:\par LVEF 60 to 70%\par No significant valvular or pericardial disease\par No evidence of pulm hypertension.\par \par Echocardiogram 21:\par Normal LV internal cavity size\par Abnormal septal motion\par LVEF 65%\par Normal biatrial size and structure\par Mild mitral regurgitation\par \par Echocardiogram 10/9/2020:\par Left ventricular ejection fraction 55 to 60%\par 1+ mitral regurgitation.\par \par \par Echo 18\par Overall normal LV size and function. LVEF 60-65%.\par Mild to moderate mitral regurgitation.\par Trivial pericardial effusion.\par Unchanged from prior study .\par \par \par Carotid Doppler 10/9/2020:\par Mild left internal carotid artery plaquing.\par Right internal carotid artery appears to be pretty clean.\par \par Pharmacologic nuclear stress test 2020:\par No significant ischemia is demonstrated.\par \par Impression:\par 1.  Blood pressures adequately controlled. Past reports of hypotension have not recurred. \par \par 2.  No longer having any chest pain.  Nuclear stress testing from 2023 reportedly shows no ischemia.\par \par 3. ECG with persistent LBBB, unchanged from prior.  (Uncertain how she had a nuclear stress test on a treadmill) pt without any coronary symptoms. Likely this change is progressive conduction disease but we can not exclude  cardiomyopathic changes.  Given absence of any significant findings on cardiac catheterization in  and more recently nuclear stress test in 2022 ischemia is far less like the echocardiogram 2021 did not show any new wall motion abnormalities other than asynchrony of the septum consistent with conduction abnormality\par \par 3.  Hyperlipidemia: Was very well controlled well on rosuvastatin, since discontinued due to myalgias.\par      Simvastatin 20 mg was tolerated albeit with a LDL that was not quite at target\par \par 4.  Major limitation seems to be her back and sciatica.\par \par 5.  3/23 [place] Echo results shows normal LV function and no significant mitral regurgitation\par \par 6.  Cardiac cath  completed in  [place] reportedly shows that stents are patent\par       Pharmacologic nuclear stress 2022 without ischemia.\par \par 7.  No significant carotid disease. No bruit on exam. \par \par 8.  Diffuse bilateral lower extremity cramping pain with diminished distal pulses.\par \par Plan\par 1.  Reassured the patient that in view of resolution of chest pain and in the absence of ischemia on nuclear stress testing and  cardiac catheterization which did not show any significant stent restenosis, her past chest pain syndrome is not likely cardiac in nature. \par \par 2.  Lower extremity arterial duplex will be obtained\par \par 3. Advised to exercise to the extent that she can. Advised to do light to moderate exercises such as biking or walking at least 30 minutes a day most days of the week. \par \par 4. Continue to follow a low-fat, low-carbohydrate diet. Continue to avoid any processed foods and those high in sodium. \par \par 5.  Restart simvastatin 20 mg with Zetia 10 mg follow-up blood work in 3 months. \par     Empiric use of coenzyme every 10 200 mg.\par \par Pt knows to call if any new or worsening symptoms. In the absence of any cardiac associated symptoms clinical follow up can be made in 4 months. \par \par

## 2023-06-30 ENCOUNTER — APPOINTMENT (OUTPATIENT)
Dept: CARDIOLOGY | Facility: CLINIC | Age: 78
End: 2023-06-30
Payer: MEDICARE

## 2023-06-30 PROCEDURE — 93925 LOWER EXTREMITY STUDY: CPT

## 2023-07-06 RX ORDER — SIMVASTATIN 20 MG/1
20 TABLET, FILM COATED ORAL
Qty: 90 | Refills: 3 | Status: DISCONTINUED | COMMUNITY
Start: 2020-06-08 | End: 2023-07-06

## 2023-07-10 RX ORDER — PANTOPRAZOLE 40 MG/1
40 TABLET, DELAYED RELEASE ORAL
Qty: 90 | Refills: 3 | Status: ACTIVE | COMMUNITY
Start: 2021-06-02 | End: 1900-01-01

## 2023-07-10 RX ORDER — METOPROLOL SUCCINATE 25 MG/1
25 TABLET, EXTENDED RELEASE ORAL
Qty: 90 | Refills: 3 | Status: ACTIVE | COMMUNITY
Start: 2020-06-08 | End: 1900-01-01

## 2023-07-10 RX ORDER — RANOLAZINE 500 MG/1
500 TABLET, EXTENDED RELEASE ORAL
Qty: 90 | Refills: 3 | Status: ACTIVE | COMMUNITY
Start: 1900-01-01 | End: 1900-01-01

## 2023-07-10 RX ORDER — CLOPIDOGREL BISULFATE 75 MG/1
75 TABLET, FILM COATED ORAL
Qty: 90 | Refills: 3 | Status: ACTIVE | COMMUNITY
Start: 2020-06-08 | End: 1900-01-01

## 2023-07-10 RX ORDER — HYDROCHLOROTHIAZIDE 12.5 MG/1
12.5 TABLET ORAL
Qty: 90 | Refills: 3 | Status: ACTIVE | COMMUNITY
Start: 2020-06-08 | End: 1900-01-01

## 2023-09-05 ENCOUNTER — APPOINTMENT (OUTPATIENT)
Dept: CARDIOLOGY | Facility: CLINIC | Age: 78
End: 2023-09-05
Payer: MEDICARE

## 2023-09-05 VITALS
RESPIRATION RATE: 14 BRPM | HEIGHT: 62 IN | WEIGHT: 140 LBS | HEART RATE: 70 BPM | BODY MASS INDEX: 25.76 KG/M2 | DIASTOLIC BLOOD PRESSURE: 70 MMHG | SYSTOLIC BLOOD PRESSURE: 130 MMHG

## 2023-09-05 DIAGNOSIS — R94.31 ABNORMAL ELECTROCARDIOGRAM [ECG] [EKG]: ICD-10-CM

## 2023-09-05 PROCEDURE — 99214 OFFICE O/P EST MOD 30 MIN: CPT

## 2023-09-05 PROCEDURE — 93000 ELECTROCARDIOGRAM COMPLETE: CPT

## 2023-09-05 RX ORDER — EZETIMIBE 10 MG/1
10 TABLET ORAL DAILY
Qty: 90 | Refills: 3 | Status: DISCONTINUED | COMMUNITY
End: 2023-09-05

## 2023-09-05 NOTE — REASON FOR VISIT
[FreeTextEntry1] : Patient presents here for a cardiac evaluation with a history which includes:  1.  LAD and RCA stenting 2014.  2.  Catheterization 2019 in [place] reportedly showing stent patency.  3.  Hypertension.  4.  Hyperlipidemia.   5.  An anginal syndrome.  6. Carotid artery atherosclerosis

## 2023-09-05 NOTE — ASSESSMENT
[FreeTextEntry1] : ECG:Sinus  Rhythm  at 70  bpm, LBBB  Lab Data        20-----3/29/21---- 21---2/3/22--22--23--23 Chol: 160--------163--------- 160-------135-------166-------178--------136 HDL:  49----------44---------- 48--------48---------50---------50-----------50 LDL:  83----------82---------- 87---------66--------89---------98-----------57 Tr---------185-------- 146-------128-------170--------200--------234 Cr     0.97---------------------1.04 A1C ---------------------------5.3   Nuclear Stress Test 22 (Done in [place] for clearance for Mohs procedure) Negative for ischemia, rate related BBB  Stress testing 3/23/2023: Time: 4 minutes 54 seconds Heart rate: 123 85% METS 4.7 ECG and SPECT imaging negative for ischemia.  Echocardiogram 3/23/2023: LVEF 60 to 70% No significant valvular or pericardial disease No evidence of pulm hypertension.  Echocardiogram 21: Normal LV internal cavity size Abnormal septal motion LVEF 65% Normal biatrial size and structure Mild mitral regurgitation  Echocardiogram 10/9/2020: Left ventricular ejection fraction 55 to 60% 1+ mitral regurgitation.   Echo 18 Overall normal LV size and function. LVEF 60-65%. Mild to moderate mitral regurgitation. Trivial pericardial effusion. Unchanged from prior study .  Lower extremity arterial duplex 2023: No evidence of any hemodynamically significant stenosis bilaterally.  Carotid Doppler 10/9/2020: Mild left internal carotid artery plaquing. Right internal carotid artery appears to be pretty clean.  Pharmacologic nuclear stress test 2020: No significant ischemia is demonstrated.  Impression: 1.  Blood pressures adequately controlled. Past reports of hypotension have not recurred.   2.  No longer having any chest pain.  Nuclear stress testing from 2023 reportedly shows no ischemia.  3. ECG with persistent LBBB, unchanged from prior.  (Uncertain how she had a nuclear stress test on a treadmill) pt without any coronary symptoms. Likely this change is progressive conduction disease but we can not exclude  cardiomyopathic changes.  Given absence of any significant findings on cardiac catheterization in  and more recently nuclear stress test in 2022 ischemia is far less like the echocardiogram 2021 did not show any new wall motion abnormalities other than asynchrony of the septum consistent with conduction abnormality  3.  Hyperlipidemia: Was very well controlled well on rosuvastatin, since discontinued due to myalgias.      Simvastatin 20 mg was tolerated albeit with a LDL that was not quite at target.  Now on Repatha alone with good control.  4.  In addition to her back/sciatic issues, having bilateral groin issues.  5.  3/23 [place] Echo results shows normal LV function and no significant mitral regurgitation  6.  Cardiac cath  completed in  [place] reportedly shows that stents are patent       Pharmacologic nuclear stress 2022 without ischemia.  7.  No significant carotid disease. No bruit on exam.   8.  Diffuse bilateral lower extremity cramping pain with diminished distal pulse, but no evidence of any significant obstructive vascular disease on arterial duplex.  Plan 1.  Reassured the patient that in view of resolution of chest pain and in the absence of ischemia on nuclear stress testing and  cardiac catheterization which did not show any significant stent restenosis, her past chest pain syndrome is not likely cardiac in nature.   2.  Suggested orthopedic evaluation for possible hip disease.  3. Advised to exercise to the extent that she can. Advised to do light to moderate exercises such as biking or walking at least 30 minutes a day most days of the week.   4. Continue to follow a low-fat, low-carbohydrate diet. Continue to avoid any processed foods and those high in sodium.   5.  Remain off of statins and use Repatha exclusively with repeat blood work in 3 months  Pt knows to call if any new or worsening symptoms. In the absence of any cardiac associated symptoms clinical follow up can be made in 4 months.

## 2023-09-05 NOTE — HISTORY OF PRESENT ILLNESS
[FreeTextEntry1] :  patient underwent an evaluation while in [place] (Dr. Scottie Rao) telephone [phone] Stress testing 3/23/2023: Time: 4 minutes 54 seconds Heart rate: 123 85% METS 4.7 ECG and SPECT imaging negative for ischemia.  Echocardiogram 3/23/2023: LVEF 60 to 70% No significant valvular or pericardial disease No evidence of pulm hypertension.  Rosuvastatin was discontinued due to bilateral lower extremity myalgias.  Improved with cessation. Previously on simvastatin 20 mg a day which was discontinued December 2021 when LDL was not at goal.  She has been walking on her treadmill fairly regularly. Reports that she has pain bilaterally in her groins worse when she gets up from a seated position Recently underwent a vascular arterial duplex Does go up and down stairs at home occasionally with shortness of breath.  Pt denies any chest pain, dizziness, syncope, near-syncope,  orthopnea or PND.  In January 2019 she had chest pain while in [place] that prompted cardiac catheterization, that by her report, demonstrated that her stents were patent. As such, a cardiac cause of her chest pain was thought to be unlikely.  She then underwent a GI evaluation and GERD was considered to be more likely.   1/26/2022, patient had a pharmacologic nuclear stress test as part of a preoperative ischemic evaluation while in [place].  This was in anticipation of resection of a skin cancer from the right face.  That test reportedly showed gated ejection fraction and absence of any significant ischemia.  In May of this year she was again in the hospital for epigastric/chest discomfort, later deemed to be GI in nature.  Nutritionally, she reports following a diet that avoids salt, excess carbohydrates and fats. Cooks most of her foods at home. Avoiding any processed foods. She is also eating smaller portions and having more frequent meals per recommendations from her PMD.

## 2023-10-29 ENCOUNTER — RX RENEWAL (OUTPATIENT)
Age: 78
End: 2023-10-29

## 2023-10-29 RX ORDER — IRBESARTAN 75 MG/1
75 TABLET ORAL
Qty: 90 | Refills: 3 | Status: ACTIVE | COMMUNITY
Start: 2018-08-23 | End: 1900-01-01

## 2023-12-10 ENCOUNTER — RX RENEWAL (OUTPATIENT)
Age: 78
End: 2023-12-10

## 2023-12-20 ENCOUNTER — APPOINTMENT (OUTPATIENT)
Dept: CARDIOLOGY | Facility: CLINIC | Age: 78
End: 2023-12-20
Payer: MEDICARE

## 2023-12-20 ENCOUNTER — APPOINTMENT (OUTPATIENT)
Dept: ORTHOPEDIC SURGERY | Facility: CLINIC | Age: 78
End: 2023-12-20
Payer: MEDICARE

## 2023-12-20 VITALS
WEIGHT: 160 LBS | SYSTOLIC BLOOD PRESSURE: 120 MMHG | DIASTOLIC BLOOD PRESSURE: 75 MMHG | HEART RATE: 77 BPM | HEIGHT: 62 IN | BODY MASS INDEX: 29.44 KG/M2

## 2023-12-20 VITALS
DIASTOLIC BLOOD PRESSURE: 80 MMHG | HEIGHT: 62 IN | RESPIRATION RATE: 16 BRPM | HEART RATE: 75 BPM | OXYGEN SATURATION: 98 % | WEIGHT: 161 LBS | BODY MASS INDEX: 29.63 KG/M2 | SYSTOLIC BLOOD PRESSURE: 120 MMHG

## 2023-12-20 DIAGNOSIS — M70.62 TROCHANTERIC BURSITIS, LEFT HIP: ICD-10-CM

## 2023-12-20 DIAGNOSIS — R06.09 OTHER FORMS OF DYSPNEA: ICD-10-CM

## 2023-12-20 DIAGNOSIS — I73.9 PERIPHERAL VASCULAR DISEASE, UNSPECIFIED: ICD-10-CM

## 2023-12-20 DIAGNOSIS — I10 ESSENTIAL (PRIMARY) HYPERTENSION: ICD-10-CM

## 2023-12-20 DIAGNOSIS — M25.551 PAIN IN RIGHT HIP: ICD-10-CM

## 2023-12-20 DIAGNOSIS — I44.7 LEFT BUNDLE-BRANCH BLOCK, UNSPECIFIED: ICD-10-CM

## 2023-12-20 DIAGNOSIS — I65.23 OCCLUSION AND STENOSIS OF BILATERAL CAROTID ARTERIES: ICD-10-CM

## 2023-12-20 DIAGNOSIS — E78.5 HYPERLIPIDEMIA, UNSPECIFIED: ICD-10-CM

## 2023-12-20 DIAGNOSIS — I25.10 ATHEROSCLEROTIC HEART DISEASE OF NATIVE CORONARY ARTERY W/OUT ANGINA PECTORIS: ICD-10-CM

## 2023-12-20 DIAGNOSIS — M25.552 PAIN IN RIGHT HIP: ICD-10-CM

## 2023-12-20 PROCEDURE — 73523 X-RAY EXAM HIPS BI 5/> VIEWS: CPT

## 2023-12-20 PROCEDURE — 93000 ELECTROCARDIOGRAM COMPLETE: CPT

## 2023-12-20 PROCEDURE — 99214 OFFICE O/P EST MOD 30 MIN: CPT

## 2023-12-20 PROCEDURE — 99204 OFFICE O/P NEW MOD 45 MIN: CPT | Mod: 25

## 2023-12-20 PROCEDURE — 20610 DRAIN/INJ JOINT/BURSA W/O US: CPT | Mod: LT

## 2023-12-20 RX ORDER — UBIDECARENONE 100 MG
100 CAPSULE ORAL
Qty: 90 | Refills: 0 | Status: ACTIVE | COMMUNITY

## 2023-12-20 NOTE — PROCEDURE
[de-identified] : I injected the patient's left hip bursa today with cortisone for greater trochanteric bursitis.  I discussed at length with the patient the planned steroid and lidocaine injection. The risks, benefits, convalescence and alternatives were reviewed. The possible side effects discussed included but were not limited to: pain, swelling, heat, bleeding, and redness. Symptoms are generally mild but if they are extensive then contact the office. Giving pain relievers by mouth such as NSAIDs or Tylenol can generally treat the reactions to steroid and lidocaine. Rare cases of infection have been noted. Rash, hives and itching may occur post injection. If you have muscle pain or cramps, flushing and or swelling of the face, rapid heart beat, nausea, dizziness, fever, chills, headache, difficulty breathing, swelling in the arms or legs, or have a prickly feeling of your skin, contact a health care provider immediately. Following this discussion, the hip was prepped with Alcohol and under sterile condition the 80 mg Depo-Medrol and 6 cc Lidocaine injection was performed with a 20 gauge needle through a superolateral injection site. The needle was introduced into the joint, aspiration was performed to ensure intra-articular placement and the medication was injected. Upon withdrawal of the needle the site was cleaned with alcohol and a band aid applied. The patient tolerated the injection well and there were no adverse effects. Post injection instructions included no strenuous activity for 24 hours, cryotherapy and if there are any adverse effects to contact the office.

## 2023-12-20 NOTE — ASSESSMENT
[FreeTextEntry1] : ECG:Sinus Rhythm at 75 bpm, LBBB  Lab Data -------7/7/20---3/29/21----11/19/21---2/3/22--12/13/22--5/2/23--8/28/23--12/4/23 Chol--160-------163-------- 160--------135-------166-------178--------136-------119 HDL----49--------44--------- 48----------48---------50---------50----------50--------51 LDL-----83--------82--------- 87----------66--------89---------98-----------57--------42 Trg----182-------185------- 146---------128-------170-------200--------234--------185 Cr-----0.97-------------------1.04 A1C ---------------------------5.3   Nuclear Stress Test 1/26/22 (Done in [place] for clearance for Mohs procedure) Negative for ischemia, rate related BBB  Stress testing 3/23/2023: Time: 4 minutes 54 seconds Heart rate: 123 85% METS 4.7 ECG and SPECT imaging negative for ischemia.  Echocardiogram 3/23/2023: LVEF 60 to 70% No significant valvular or pericardial disease No evidence of pulm hypertension.  Echocardiogram 12/4/21: Normal LV internal cavity size Abnormal septal motion LVEF 65% Normal biatrial size and structure Mild mitral regurgitation  Echocardiogram 10/9/2020: Left ventricular ejection fraction 55 to 60% 1+ mitral regurgitation.   Echo 11/20/18 Overall normal LV size and function. LVEF 60-65%. Mild to moderate mitral regurgitation. Trivial pericardial effusion. Unchanged from prior study 2014.  Lower extremity arterial duplex 6/30/2023: No evidence of any hemodynamically significant stenosis bilaterally.  Carotid Doppler 10/9/2020: Mild left internal carotid artery plaquing. Right internal carotid artery appears to be pretty clean.  Pharmacologic nuclear stress test 8/8/2020: No significant ischemia is demonstrated.  Impression: 1. Blood pressures  elevations x 1 months but pressures are being taken shortly after she takes her morning metoprolol. Uncertain what role the chronic left hip pain is playing with regard to this.  2. No longer having any chest pain. Nuclear stress testing from March 2023 reportedly shows no ischemia.  3. ECG with persistent LBBB, unchanged from prior.   Patient underwent  treadmill nuclear stress ??? Pt without any coronary symptoms. Likely this change is progressive conduction disease but we can not exclude cardiomyopathic changes. Given absence of any significant findings on cardiac catheterization in 2019 and more recently nuclear stress test in Jan 2022 ischemia is far less like the echocardiogram December 2021 did not show any new wall motion abnormalities other than asynchrony of the septum consistent with conduction abnormality  3. Hyperlipidemia: Was very well controlled well on rosuvastatin, since discontinued due to myalgias.  Simvastatin 20 mg was tolerated albeit with a LDL that was not quite at target. Now on Repatha alone with good control.  4. In addition to her back/sciatic issues, having bilateral groin issues,  which may reflect hip  disease  5. 3/23 [place] Echo results shows normal LV function and no significant mitral regurgitation  6. Cardiac cath Jan of 2019 completed in [place] reportedly shows that stents are patent  Pharmacologic nuclear stress 1/26/2022 without ischemia.  7. No significant carotid disease. No bruit on exam.  8. Diffuse bilateral lower extremity cramping pain with diminished distal pulse, but no evidence of any significant obstructive vascular disease on arterial duplex.  Plan 1. Reassured the patient that in view of resolution of chest pain and in the absence of ischemia on nuclear stress testing and cardiac catheterization which did not show any significant stent restenosis, her past chest pain syndrome is not likely cardiac in nature.  2. Suggested orthopedic evaluation for possible hip disease.  3. Advised to exercise to the extent that she can. Advised to do light to moderate exercises such as biking or walking at least 30 minutes a day most days of the week.  4. Continue to follow a low-fat, low-carbohydrate diet. Continue to avoid any processed foods and those high in sodium.  5. Remain off of statins and use Repatha exclusively with repeat blood work in 3 months   6.  Patient will monitor blood pressure at home for 1 month checking the blood pressure at least 2 hours after medication and then bring in her list with the machine to be checked in 2 weeks.

## 2023-12-20 NOTE — DISCUSSION/SUMMARY
[Medication Risks Reviewed] : Medication risks reviewed [de-identified] : 79 y/o F pt presents with mild bilateral hip osteoarthritis. The nature of her condition and treatment options were discussed in detail. The pt is not a candidate for a staged bilateral JUSTIN. The pt is symptomatic from hip bursitis. We recommend conservative treatment such as cortisone bursa injections, anti-inflammatories, PT, and low impact exercise. The pt opted for a left hip bursa injection which she tolerated well. The pt will f/u in 3 months for re-evaluation. If the pt shows no improvement, we may pursue an MRI of the hip or LS.

## 2023-12-20 NOTE — PHYSICAL EXAM
[LE] : Sensory: Intact in bilateral lower extremities [ALL] : dorsalis pedis, posterior tibial, femoral, popliteal, and radial 2+ and symmetric bilaterally [de-identified] : GENERAL APPEARANCE: Well nourished and hydrated, pleasant, alert, and oriented x 3. Appears their stated age.  HEENT: Normocephalic, extraocular eye motion intact. Nasal septum midline. Oral cavity clear. External auditory canal clear.  RESPIRATORY: Breath sounds clear and audible in all lobes. No wheezing, No accessory muscle use. CARDIOVASCULAR: No apparent abnormalities. No lower leg edema. No varicosities. Pedal pulses are palpable. NEUROLOGIC: Sensation is normal, no muscle weakness in the upper or lower extremities. DERMATOLOGIC: No apparent skin lesions, moist, warm, no rash. SPINE: Cervical spine appears normal and moves freely; thoracic spine appears normal and moves freely; lumbosacral spine appears normal and moves freely, normal, nontender. MUSCULOSKELETAL: Hands, wrists, and elbows are normal and move freely, shoulders are normal and move freely.  [de-identified] : Bilateral hip exam shows greater trochanteric tenderness, no pain with hip ROM, no pain with straight leg raise, -Stinchfield  [de-identified] : 5V xray of the b/l hip done in the office today and reviewed by Dr. Lopez Palomino demonstrates mild bilateral hip osteoarthritis.

## 2023-12-20 NOTE — HISTORY OF PRESENT ILLNESS
[de-identified] : The patient is a 78 year old female with bilateral hip pain left greater than right.  She reports left-sided groin pain that radiates down the leg towards the foot.  She was on a statin and thought this may be causing her muscular symptoms but she was taken off of it and continues to have bilateral hip pain

## 2023-12-20 NOTE — HISTORY OF PRESENT ILLNESS
[FreeTextEntry1] :  expresses concerns about increased blood pressure in the last 1 month. States typically running less than 120 over mid 70s.  PNow running high 130s over high 80s. Cannot identify cause but is having left hip pain which is limiting her quite a bit and she is seeing orthopedicsa today. Takes metoprolol at 8:30 in the morning and checks her blood pressure shortly after.t Irbesartan taken at 10 PM.i Uses Aleve only about once a week and no othere  NSAIDs.    Patient underwent an evaluation while in [place] (Dr. Scottie Rao) telephone [phone] Stress testing 3/23/2023: Time: 4 minutes 54 seconds Heart rate: 123 85% METS 4.7 ECG and SPECT imaging negative for ischemia.  Echocardiogram 3/23/2023: LVEF 60 to 70% No significant valvular or pericardial disease No evidence of pulm hypertension.  Rosuvastatin was discontinued due to bilateral lower extremity myalgias.  Improved with cessation. Previously on simvastatin 20 mg a day which was discontinued December 2021 when LDL was not at goal.  She has been walking on her treadmill fairly regularly. Reports that she has pain bilaterally in her groins worse when she gets up from a seated position Recently underwent a vascular arterial duplex Does go up and down stairs at home occasionally with shortness of breath.  Pt denies any chest pain, dizziness, syncope, near-syncope,  orthopnea or PND.  In January 2019 she had chest pain while in [place] that prompted cardiac catheterization, that by her report, demonstrated that her stents were patent. As such, a cardiac cause of her chest pain was thought to be unlikely.  She then underwent a GI evaluation and GERD was considered to be more likely.   1/26/2022, patient had a pharmacologic nuclear stress test as part of a preoperative ischemic evaluation while in [place].  This was in anticipation of resection of a skin cancer from the right face.  That test reportedly showed gated ejection fraction and absence of any significant ischemia.  In May of this year she was again in the hospital for epigastric/chest discomfort, later deemed to be GI in nature.  Nutritionally, she reports following a diet that avoids salt, excess carbohydrates and fats. Cooks most of her foods at home. Avoiding any processed foods. She is also eating smaller portions and having more frequent meals per recommendations from her PMD.

## 2024-01-02 ENCOUNTER — NON-APPOINTMENT (OUTPATIENT)
Age: 79
End: 2024-01-02

## 2024-01-02 ENCOUNTER — APPOINTMENT (OUTPATIENT)
Dept: CARDIOLOGY | Facility: CLINIC | Age: 79
End: 2024-01-02
Payer: MEDICARE

## 2024-01-02 PROCEDURE — ZZZZZ: CPT

## 2024-01-03 RX ORDER — EVOLOCUMAB 140 MG/ML
140 INJECTION, SOLUTION SUBCUTANEOUS
Qty: 6 | Refills: 3 | Status: ACTIVE | COMMUNITY
Start: 2023-07-05 | End: 1900-01-01

## 2024-06-24 ENCOUNTER — APPOINTMENT (OUTPATIENT)
Dept: ORTHOPEDIC SURGERY | Facility: CLINIC | Age: 79
End: 2024-06-24
Payer: MEDICARE

## 2024-06-24 VITALS
HEART RATE: 78 BPM | SYSTOLIC BLOOD PRESSURE: 124 MMHG | BODY MASS INDEX: 29.44 KG/M2 | WEIGHT: 160 LBS | DIASTOLIC BLOOD PRESSURE: 78 MMHG | HEIGHT: 62 IN

## 2024-06-24 PROCEDURE — 99213 OFFICE O/P EST LOW 20 MIN: CPT | Mod: 25

## 2024-06-24 PROCEDURE — 20610 DRAIN/INJ JOINT/BURSA W/O US: CPT | Mod: LT

## 2024-07-10 ENCOUNTER — APPOINTMENT (OUTPATIENT)
Dept: CARDIOLOGY | Facility: CLINIC | Age: 79
End: 2024-07-10
Payer: MEDICARE

## 2024-07-10 VITALS
DIASTOLIC BLOOD PRESSURE: 78 MMHG | BODY MASS INDEX: 29.26 KG/M2 | OXYGEN SATURATION: 95 % | SYSTOLIC BLOOD PRESSURE: 122 MMHG | HEART RATE: 63 BPM | HEIGHT: 62 IN | RESPIRATION RATE: 16 BRPM | WEIGHT: 159 LBS

## 2024-07-10 DIAGNOSIS — I73.9 PERIPHERAL VASCULAR DISEASE, UNSPECIFIED: ICD-10-CM

## 2024-07-10 DIAGNOSIS — I25.10 ATHEROSCLEROTIC HEART DISEASE OF NATIVE CORONARY ARTERY W/OUT ANGINA PECTORIS: ICD-10-CM

## 2024-07-10 DIAGNOSIS — I65.23 OCCLUSION AND STENOSIS OF BILATERAL CAROTID ARTERIES: ICD-10-CM

## 2024-07-10 DIAGNOSIS — I44.7 LEFT BUNDLE-BRANCH BLOCK, UNSPECIFIED: ICD-10-CM

## 2024-07-10 DIAGNOSIS — R07.9 CHEST PAIN, UNSPECIFIED: ICD-10-CM

## 2024-07-10 PROCEDURE — G2211 COMPLEX E/M VISIT ADD ON: CPT

## 2024-07-10 PROCEDURE — 93000 ELECTROCARDIOGRAM COMPLETE: CPT

## 2024-07-10 PROCEDURE — 99214 OFFICE O/P EST MOD 30 MIN: CPT

## 2024-07-16 RX ORDER — HYDROCHLOROTHIAZIDE 12.5 MG/1
12.5 TABLET ORAL DAILY
Qty: 90 | Refills: 1 | Status: ACTIVE | COMMUNITY
Start: 1900-01-01 | End: 1900-01-01

## 2024-10-02 ENCOUNTER — APPOINTMENT (OUTPATIENT)
Dept: CARDIOLOGY | Facility: CLINIC | Age: 79
End: 2024-10-02
Payer: MEDICARE

## 2024-10-02 VITALS
DIASTOLIC BLOOD PRESSURE: 86 MMHG | HEART RATE: 63 BPM | WEIGHT: 161 LBS | HEIGHT: 62 IN | RESPIRATION RATE: 16 BRPM | SYSTOLIC BLOOD PRESSURE: 122 MMHG | BODY MASS INDEX: 29.63 KG/M2

## 2024-10-02 DIAGNOSIS — Z01.810 ENCOUNTER FOR PREPROCEDURAL CARDIOVASCULAR EXAMINATION: ICD-10-CM

## 2024-10-02 DIAGNOSIS — E78.5 HYPERLIPIDEMIA, UNSPECIFIED: ICD-10-CM

## 2024-10-02 DIAGNOSIS — I25.10 ATHEROSCLEROTIC HEART DISEASE OF NATIVE CORONARY ARTERY W/OUT ANGINA PECTORIS: ICD-10-CM

## 2024-10-02 DIAGNOSIS — I10 ESSENTIAL (PRIMARY) HYPERTENSION: ICD-10-CM

## 2024-10-02 PROCEDURE — G2211 COMPLEX E/M VISIT ADD ON: CPT

## 2024-10-02 PROCEDURE — 99214 OFFICE O/P EST MOD 30 MIN: CPT

## 2024-10-02 NOTE — ASSESSMENT
[FreeTextEntry1] : No EKG today. EKG done at [facility] presurgical testing shows SR LBBB, unchanged when compared to her prior EKG's  Lab Data -------7/7/20---3/29/21----11/19/21---2/3/22--12/13/22--5/2/23--8/28/23--12/4/23--6/18/24 Chol--160-------163-------- 160--------135-------166-------178--------136-------119-------135 HDL----49--------44--------- 48----------48---------50---------50----------50--------51---------50 LDL-----83--------82--------- 87----------66--------89---------98-----------57-------42---------56 Trg----182-------185------- 146---------128-------170-------200--------234-------185-------232 Cr-----0.97-------------------1.04 A1C ---------------------------5.3   Nuclear Stress Test 1/26/22 (Done in [place] for clearance for Mohs procedure) Negative for ischemia, rate related BBB  Stress testing 3/23/2023: Time: 4 minutes 54 seconds Heart rate: 123 85% METS 4.7 ECG and SPECT imaging negative for ischemia.  Echocardiogram 3/23/2023: LVEF 60 to 70% No significant valvular or pericardial disease No evidence of pulm hypertension.  Echocardiogram 12/4/21: Normal LV internal cavity size Abnormal septal motion LVEF 65% Normal biatrial size and structure Mild mitral regurgitation  Echocardiogram 10/9/2020: Left ventricular ejection fraction 55 to 60% 1+ mitral regurgitation.  Echo 11/20/18 Overall normal LV size and function. LVEF 60-65%. Mild to moderate mitral regurgitation. Trivial pericardial effusion. Unchanged from prior study 2014.  Lower extremity arterial duplex 6/30/2023: No evidence of any hemodynamically significant stenosis bilaterally.  Carotid Doppler 10/9/2020: Mild left internal carotid artery plaquing. Right internal carotid artery appears to be pretty clean.  Pharmacologic nuclear stress test 8/8/2020: No significant ischemia is demonstrated.  IMPRESSION: 79-YEAR-OLD female here for cardiac risk stratification for planned surgical excision of melanoma on her right upper extremity.   1. Patient without any active cardiac symptoms. EKG at [facility] shows LBBB which is chronic for her. She has no anginal symptoms on exertion.  Cardiac cath Jan of 2019 completed in [place] reportedly shows that stents are patent.  Pharmacologic nuclear stress 1/26/2022 without ischemia.  2. HTN well-controlled on current regimen.  3. Lipids well controlled on Repatha.   PLAN:  1. No absolute cardia contraindication to her undergoing the proposed surgery. She was advised to take Irbesartan and Metoprolol per her regular schedule with small sips of water. HCTZ can be held the day of surgery. Aspirin and Plavix may be held for 5 days prior to surgery and resumed when the risk for bleeding has passed.   2. No changes to her medication regimen at this time.   3. Continue to follow a heart healthy diet consisting of more vegetables, leans meats, whole grains, nuts and fruits. Avoid trans fats, saturated fats and processed meats. Avoid salt.   4. Pt advised to exercise for at least 30 minutes most days of the week as tolerated. Any cardiac symptoms such as chest pain, palpitations or new shortness of breath should be reported.  5. Continue Repatha for HLD.   Clinical follow-up in December as scheduled.

## 2024-10-02 NOTE — HISTORY OF PRESENT ILLNESS
[FreeTextEntry1] : Patient reports feeling well. Denies any recent cardiac symptoms. She denies any chest pain, dizziness, syncope, near-syncope, SOB, edema, orthopnea or PND. Tolerating current medications.   Has tolerated surgery in the past without cardiac complications. Does have a hx of vomiting after surgery.   Is anticipating the birth of her first great grandchild soon.

## 2024-10-02 NOTE — REASON FOR VISIT
[FreeTextEntry1] : CAS BRANCH is a 79 year-old F presents here for cardiac risk stratification for surgical excision of melanoma of the right upper extremity.  Her medical history includes: - CAD, S/P LAD and RCA stenting in 2014 - HTN - HLD - Anginal syndrome - Carotid artery atherosclerosis

## 2024-10-02 NOTE — ASSESSMENT
[FreeTextEntry1] : No EKG today. [facility] shows SR LBBB, unchanged when compared to her prior EKG's  Lab Data -------7/7/20---3/29/21----11/19/21---2/3/22--12/13/22--5/2/23--8/28/23--12/4/23--6/18/24 Chol--160-------163-------- 160--------135-------166-------178--------136-------119-------135 HDL----49--------44--------- 48----------48---------50---------50----------50--------51---------50 LDL-----83--------82--------- 87----------66--------89---------98-----------57-------42---------56 Trg----182-------185------- 146---------128-------170-------200--------234-------185-------232 Cr-----0.97-------------------1.04 A1C ---------------------------5.3   Nuclear Stress Test 1/26/22 (Done in [place] for clearance for Mohs procedure) Negative for ischemia, rate related BBB  Stress testing 3/23/2023: Time: 4 minutes 54 seconds Heart rate: 123 85% METS 4.7 ECG and SPECT imaging negative for ischemia.  Echocardiogram 3/23/2023: LVEF 60 to 70% No significant valvular or pericardial disease No evidence of pulm hypertension.  Echocardiogram 12/4/21: Normal LV internal cavity size Abnormal septal motion LVEF 65% Normal biatrial size and structure Mild mitral regurgitation  Echocardiogram 10/9/2020: Left ventricular ejection fraction 55 to 60% 1+ mitral regurgitation.  Echo 11/20/18 Overall normal LV size and function. LVEF 60-65%. Mild to moderate mitral regurgitation. Trivial pericardial effusion. Unchanged from prior study 2014.  Lower extremity arterial duplex 6/30/2023: No evidence of any hemodynamically significant stenosis bilaterally.  Carotid Doppler 10/9/2020: Mild left internal carotid artery plaquing. Right internal carotid artery appears to be pretty clean.  Pharmacologic nuclear stress test 8/8/2020: No significant ischemia is demonstrated.  IMPRESSION: 79-YEAR-OLD female here for cardiac risk stratification for planned surgical excision of melanoma on her right upper extremity.   1. Patient without any active cardiac symptoms. EKG at [facility] shows LBBB which is chronic for her. She has no anginal symptoms on exertion.  Cardiac cath Jan of 2019 completed in [place] reportedly shows that stents are patent.  Pharmacologic nuclear stress 1/26/2022 without ischemia.  2. HTN well-controlled on current regimen.  3. Lipids well controlled on Repatha.   PLAN:  1. No absolute cardia contraindication to her undergoing the proposed surgery. She was advised to take Irbesartan and Metoprolol per her regular schedule with small sips of water. HCTZ can be held the day of surgery. Aspirin and Plavix may be held for 5 days prior to surgery and resumed when the risk for bleeding has passed.   2. No changes to her medication regimen at this time.   3. Continue to follow a heart healthy diet consisting of more vegetables, leans meats, whole grains, nuts and fruits. Avoid trans fats, saturated fats and processed meats. Avoid salt.   4. Pt advised to exercise for at least 30 minutes most days of the week as tolerated. Any cardiac symptoms such as chest pain, palpitations or new shortness of breath should be reported.  5. Continue Repatha for HLD.   Clinical follow-up in December as scheduled.

## 2024-12-18 ENCOUNTER — APPOINTMENT (OUTPATIENT)
Dept: CARDIOLOGY | Facility: CLINIC | Age: 79
End: 2024-12-18
Payer: MEDICARE

## 2024-12-18 VITALS
OXYGEN SATURATION: 97 % | DIASTOLIC BLOOD PRESSURE: 70 MMHG | HEIGHT: 62 IN | SYSTOLIC BLOOD PRESSURE: 120 MMHG | BODY MASS INDEX: 29.26 KG/M2 | HEART RATE: 65 BPM | RESPIRATION RATE: 16 BRPM | WEIGHT: 159 LBS

## 2024-12-18 DIAGNOSIS — I73.9 PERIPHERAL VASCULAR DISEASE, UNSPECIFIED: ICD-10-CM

## 2024-12-18 DIAGNOSIS — I10 ESSENTIAL (PRIMARY) HYPERTENSION: ICD-10-CM

## 2024-12-18 DIAGNOSIS — I25.10 ATHEROSCLEROTIC HEART DISEASE OF NATIVE CORONARY ARTERY W/OUT ANGINA PECTORIS: ICD-10-CM

## 2024-12-18 DIAGNOSIS — I44.7 LEFT BUNDLE-BRANCH BLOCK, UNSPECIFIED: ICD-10-CM

## 2024-12-18 DIAGNOSIS — E78.5 HYPERLIPIDEMIA, UNSPECIFIED: ICD-10-CM

## 2024-12-18 DIAGNOSIS — R06.09 OTHER FORMS OF DYSPNEA: ICD-10-CM

## 2024-12-18 DIAGNOSIS — Z01.810 ENCOUNTER FOR PREPROCEDURAL CARDIOVASCULAR EXAMINATION: ICD-10-CM

## 2024-12-18 PROCEDURE — 99214 OFFICE O/P EST MOD 30 MIN: CPT

## 2024-12-18 PROCEDURE — 93000 ELECTROCARDIOGRAM COMPLETE: CPT

## 2024-12-18 PROCEDURE — G2211 COMPLEX E/M VISIT ADD ON: CPT

## 2024-12-30 ENCOUNTER — APPOINTMENT (OUTPATIENT)
Dept: ORTHOPEDIC SURGERY | Facility: CLINIC | Age: 79
End: 2024-12-30
Payer: MEDICARE

## 2024-12-30 VITALS
SYSTOLIC BLOOD PRESSURE: 134 MMHG | HEART RATE: 73 BPM | BODY MASS INDEX: 28.52 KG/M2 | DIASTOLIC BLOOD PRESSURE: 96 MMHG | HEIGHT: 62 IN | WEIGHT: 155 LBS

## 2024-12-30 DIAGNOSIS — M70.62 TROCHANTERIC BURSITIS, LEFT HIP: ICD-10-CM

## 2024-12-30 PROCEDURE — 20610 DRAIN/INJ JOINT/BURSA W/O US: CPT | Mod: LT

## 2024-12-30 PROCEDURE — 99213 OFFICE O/P EST LOW 20 MIN: CPT | Mod: 25
